# Patient Record
Sex: FEMALE | Race: WHITE | NOT HISPANIC OR LATINO | Employment: FULL TIME | URBAN - METROPOLITAN AREA
[De-identification: names, ages, dates, MRNs, and addresses within clinical notes are randomized per-mention and may not be internally consistent; named-entity substitution may affect disease eponyms.]

---

## 2025-07-12 ENCOUNTER — HOSPITAL ENCOUNTER (EMERGENCY)
Facility: HOSPITAL | Age: 25
Discharge: HOME | End: 2025-07-12
Payer: OTHER MISCELLANEOUS

## 2025-07-12 ENCOUNTER — APPOINTMENT (EMERGENCY)
Dept: RADIOLOGY | Facility: HOSPITAL | Age: 25
End: 2025-07-12
Payer: OTHER MISCELLANEOUS

## 2025-07-12 VITALS
HEART RATE: 97 BPM | WEIGHT: 165 LBS | RESPIRATION RATE: 18 BRPM | DIASTOLIC BLOOD PRESSURE: 82 MMHG | OXYGEN SATURATION: 100 % | HEIGHT: 65 IN | BODY MASS INDEX: 27.49 KG/M2 | TEMPERATURE: 98.3 F | SYSTOLIC BLOOD PRESSURE: 134 MMHG

## 2025-07-12 DIAGNOSIS — S82.839A CLOSED FRACTURE OF DISTAL FIBULA: Primary | ICD-10-CM

## 2025-07-12 PROCEDURE — 73610 X-RAY EXAM OF ANKLE: CPT

## 2025-07-12 PROCEDURE — 99284 EMERGENCY DEPT VISIT MOD MDM: CPT

## 2025-07-12 PROCEDURE — 99283 EMERGENCY DEPT VISIT LOW MDM: CPT

## 2025-07-12 RX ORDER — IBUPROFEN 600 MG/1
600 TABLET, FILM COATED ORAL ONCE
Status: COMPLETED | OUTPATIENT
Start: 2025-07-12 | End: 2025-07-12

## 2025-07-12 RX ORDER — ACETAMINOPHEN 325 MG/1
975 TABLET ORAL ONCE
Status: COMPLETED | OUTPATIENT
Start: 2025-07-12 | End: 2025-07-12

## 2025-07-12 RX ADMIN — IBUPROFEN 600 MG: 600 TABLET ORAL at 00:50

## 2025-07-12 RX ADMIN — ACETAMINOPHEN 975 MG: 325 TABLET ORAL at 00:50

## 2025-07-12 NOTE — DISCHARGE INSTRUCTIONS
Please use Tylenol, Motrin, ice, and elevate your ankle to reduce swelling and to treat your pain.  Please follow-up with orthopedic surgery within 2 weeks to be reevaluated for your fracture.    Please return if you develop any new or concerning symptoms including severe swelling, numbness or tingling, or other concerns.

## 2025-07-12 NOTE — Clinical Note
Rosalinda Naik was seen and treated in our emergency department on 7/12/2025.                Diagnosis:     Rosalinda  may return to work on return date.    She may return on this date: 07/15/2025         If you have any questions or concerns, please don't hesitate to call.      Robinson Patel MD    ______________________________           _______________          _______________  Hospital Representative                              Date                                Time

## 2025-07-12 NOTE — ED PROVIDER NOTES
Time reflects when diagnosis was documented in both MDM as applicable and the Disposition within this note       Time User Action Codes Description Comment    7/12/2025  1:22 AM Jorge Robinson Add [S82.831I] Closed fracture of distal fibula           ED Disposition       ED Disposition   Discharge    Condition   Stable    Date/Time   Sat Jul 12, 2025  1:21 AM    Comment   Rosalinda Naik discharge to home/self care.                   Assessment & Plan       Medical Decision Making  25-year-old female presenting today for evaluation of right ankle injury after slipping and falling while at work, denies head strike or loss of conscious.  Endorsing medial ankle pain and swelling and an audible crunch heard during time of injury.    Differential: Ankle fracture versus sprain versus dislocation    Plan: Right ankle x-ray, Motrin, Tylenol, ice, elevation    X-ray on my interpretation showing a minimally displaced distal fibula fracture.  Will plan to provide cam boot and crutches.  Referral to orthopedic surgery.  Instructions for pain control given.  Return precautions given, all questions answered.  Work note given.      Amount and/or Complexity of Data Reviewed  Radiology: ordered and independent interpretation performed.    Risk  OTC drugs.  Prescription drug management.             Medications   ibuprofen (MOTRIN) tablet 600 mg (600 mg Oral Given 7/12/25 0050)   acetaminophen (TYLENOL) tablet 975 mg (975 mg Oral Given 7/12/25 0050)       ED Risk Strat Scores                    No data recorded        SBIRT 22yo+      Flowsheet Row Most Recent Value   Initial Alcohol Screen: US AUDIT-C     1. How often do you have a drink containing alcohol? 0 Filed at: 07/12/2025 0038   2. How many drinks containing alcohol do you have on a typical day you are drinking?  0 Filed at: 07/12/2025 0038   3b. FEMALE Any Age, or MALE 65+: How often do you have 4 or more drinks on one occassion? 0 Filed at: 07/12/2025 0038   Audit-C  "Score 0 Filed at: 07/12/2025 0038   ADRI: How many times in the past year have you...    Used an illegal drug or used a prescription medication for non-medical reasons? Never Filed at: 07/12/2025 0038                            History of Present Illness       Chief Complaint   Patient presents with    Ankle Injury     Pt comes from working at [employer], mopped the floor, slipped, fell onto R ankle and felt a \"crunch\".        Past Medical History[1]   Past Surgical History[2]   Family History[3]   Social History[4]   E-Cigarette/Vaping      E-Cigarette/Vaping Substances      I have reviewed and agree with the history as documented.     Patient is a 25-year-old female presenting today for evaluation of an ankle injury.  Patient states that she was at work at [employer] mopping the floor when she accidentally slipped and twisted her right ankle.  She says that she fell to the ground but did not hit her head or lose conscious.  She says she did hear an audible crunch when this injury happened.  She is endorsing swelling and pain to the area.  She had no medicines prior to arrival.  She denies any numbness, tingling, or weakness.        Review of Systems   Constitutional:  Negative for chills and fever.   HENT:  Negative for congestion, rhinorrhea and sore throat.    Eyes:  Negative for pain and visual disturbance.   Respiratory:  Negative for cough and shortness of breath.    Cardiovascular:  Negative for chest pain and palpitations.   Gastrointestinal:  Negative for abdominal pain, constipation, diarrhea, nausea and vomiting.   Genitourinary:  Negative for dysuria and hematuria.   Musculoskeletal:  Negative for back pain and neck pain.   Skin:  Negative for color change and rash.   Neurological:  Negative for weakness and numbness.   All other systems reviewed and are negative.          Objective       ED Triage Vitals   Temperature Pulse Blood Pressure Respirations SpO2 Patient Position - Orthostatic VS   07/12/25 0036 " 07/12/25 0036 07/12/25 0036 07/12/25 0036 07/12/25 0036 07/12/25 0036   98.3 °F (36.8 °C) 97 134/82 18 100 % Sitting      Temp Source Heart Rate Source BP Location FiO2 (%) Pain Score    07/12/25 0036 07/12/25 0036 07/12/25 0036 -- 07/12/25 0050    Tympanic Monitor Right arm  7      Vitals      Date and Time Temp Pulse SpO2 Resp BP Pain Score FACES Pain Rating User   07/12/25 0050 -- -- -- -- -- 7 -- ED   07/12/25 0036 98.3 °F (36.8 °C) 97 100 % 18 134/82 -- -- EJ            Physical Exam  Vitals and nursing note reviewed.   Constitutional:       General: She is not in acute distress.     Appearance: Normal appearance. She is well-developed. She is not ill-appearing, toxic-appearing or diaphoretic.   HENT:      Head: Normocephalic and atraumatic.      Right Ear: External ear normal.      Left Ear: External ear normal.      Nose: Nose normal. No congestion or rhinorrhea.      Mouth/Throat:      Mouth: Mucous membranes are moist.     Eyes:      General: No scleral icterus.        Right eye: No discharge.         Left eye: No discharge.      Conjunctiva/sclera: Conjunctivae normal.       Cardiovascular:      Rate and Rhythm: Normal rate and regular rhythm.      Pulses: Normal pulses.      Heart sounds: Normal heart sounds.   Pulmonary:      Effort: Pulmonary effort is normal.      Breath sounds: Normal breath sounds.   Abdominal:      General: Abdomen is flat.      Palpations: Abdomen is soft.     Musculoskeletal:      Cervical back: Normal range of motion and neck supple.      Comments: Medial ankle swelling with pain ovation of the medial malleolus.  2+ DP pulse with symmetric and intact sensation.  There is no pain ovation of the lateral malleolus, base of the fifth metatarsal, dorsal or plantar aspect of the foot, or fibular head.  There is full passive range of motion of the ankle, minimal active range of motion secondary to pain.     Skin:     General: Skin is warm and dry.      Capillary Refill: Capillary refill  takes less than 2 seconds.      Coloration: Skin is not jaundiced or pale.     Neurological:      General: No focal deficit present.      Mental Status: She is alert and oriented to person, place, and time.     Psychiatric:         Mood and Affect: Mood normal.         Behavior: Behavior normal.         Results Reviewed       None            XR ankle 3+ views RIGHT   ED Interpretation by Robinson Patel MD (07/12 0120)   Abnormal   Minimally displaced distal fibular fracture          Procedures    ED Medication and Procedure Management   None     There are no discharge medications for this patient.      ED SEPSIS DOCUMENTATION   Time reflects when diagnosis was documented in both MDM as applicable and the Disposition within this note       Time User Action Codes Description Comment    7/12/2025  1:22 AM Robinson Patel Add [S82.839A] Closed fracture of distal fibula                      [1] No past medical history on file.  [2]   Past Surgical History:  Procedure Laterality Date    BACK SURGERY     [3] No family history on file.  [4]   Social History  Tobacco Use    Smoking status: Never    Smokeless tobacco: Never        Robinson Patel MD  07/12/25 4450

## 2025-07-15 ENCOUNTER — APPOINTMENT (OUTPATIENT)
Dept: RADIOLOGY | Facility: CLINIC | Age: 25
End: 2025-07-15
Attending: ORTHOPAEDIC SURGERY
Payer: COMMERCIAL

## 2025-07-15 ENCOUNTER — OFFICE VISIT (OUTPATIENT)
Dept: OBGYN CLINIC | Facility: CLINIC | Age: 25
End: 2025-07-15
Payer: COMMERCIAL

## 2025-07-15 VITALS — HEIGHT: 65 IN | BODY MASS INDEX: 27.49 KG/M2 | WEIGHT: 165 LBS

## 2025-07-15 DIAGNOSIS — S82.831A OTHER CLOSED FRACTURE OF DISTAL END OF RIGHT FIBULA, INITIAL ENCOUNTER: ICD-10-CM

## 2025-07-15 DIAGNOSIS — S82.839A CLOSED FRACTURE OF DISTAL FIBULA: ICD-10-CM

## 2025-07-15 PROCEDURE — 73610 X-RAY EXAM OF ANKLE: CPT

## 2025-07-15 PROCEDURE — 99203 OFFICE O/P NEW LOW 30 MIN: CPT | Performed by: ORTHOPAEDIC SURGERY

## 2025-07-15 RX ORDER — LORATADINE 10 MG/1
TABLET ORAL DAILY
COMMUNITY

## 2025-07-15 RX ORDER — ESCITALOPRAM OXALATE 10 MG/1
TABLET ORAL
COMMUNITY
Start: 2025-06-16

## 2025-07-26 DIAGNOSIS — S82.831A OTHER CLOSED FRACTURE OF DISTAL END OF RIGHT FIBULA, INITIAL ENCOUNTER: Primary | ICD-10-CM

## 2025-07-29 ENCOUNTER — OFFICE VISIT (OUTPATIENT)
Dept: OBGYN CLINIC | Facility: CLINIC | Age: 25
End: 2025-07-29

## 2025-07-29 ENCOUNTER — APPOINTMENT (OUTPATIENT)
Dept: RADIOLOGY | Facility: CLINIC | Age: 25
End: 2025-07-29
Attending: ORTHOPAEDIC SURGERY
Payer: COMMERCIAL

## 2025-07-29 VITALS — BODY MASS INDEX: 27.46 KG/M2 | HEIGHT: 65 IN

## 2025-07-29 DIAGNOSIS — S82.831D OTHER CLOSED FRACTURE OF DISTAL END OF RIGHT FIBULA WITH ROUTINE HEALING, SUBSEQUENT ENCOUNTER: Primary | ICD-10-CM

## 2025-07-29 DIAGNOSIS — S82.831A OTHER CLOSED FRACTURE OF DISTAL END OF RIGHT FIBULA, INITIAL ENCOUNTER: ICD-10-CM

## 2025-07-29 PROCEDURE — 73610 X-RAY EXAM OF ANKLE: CPT

## 2025-07-29 PROCEDURE — 99213 OFFICE O/P EST LOW 20 MIN: CPT | Performed by: ORTHOPAEDIC SURGERY

## 2025-08-18 ENCOUNTER — TELEPHONE (OUTPATIENT)
Dept: OBGYN CLINIC | Facility: CLINIC | Age: 25
End: 2025-08-18

## 2025-08-19 ENCOUNTER — APPOINTMENT (OUTPATIENT)
Dept: RADIOLOGY | Facility: CLINIC | Age: 25
End: 2025-08-19
Attending: ORTHOPAEDIC SURGERY
Payer: COMMERCIAL

## 2025-08-19 ENCOUNTER — OFFICE VISIT (OUTPATIENT)
Dept: OBGYN CLINIC | Facility: CLINIC | Age: 25
End: 2025-08-19
Payer: OTHER MISCELLANEOUS

## 2025-08-19 VITALS — HEIGHT: 65 IN | BODY MASS INDEX: 27.46 KG/M2

## 2025-08-19 DIAGNOSIS — S82.831D OTHER CLOSED FRACTURE OF DISTAL END OF RIGHT FIBULA WITH ROUTINE HEALING, SUBSEQUENT ENCOUNTER: Primary | ICD-10-CM

## 2025-08-19 DIAGNOSIS — S82.831D OTHER CLOSED FRACTURE OF DISTAL END OF RIGHT FIBULA WITH ROUTINE HEALING, SUBSEQUENT ENCOUNTER: ICD-10-CM

## 2025-08-19 PROCEDURE — 73610 X-RAY EXAM OF ANKLE: CPT

## 2025-08-19 PROCEDURE — 99213 OFFICE O/P EST LOW 20 MIN: CPT | Performed by: ORTHOPAEDIC SURGERY
